# Patient Record
Sex: FEMALE | Race: WHITE
[De-identification: names, ages, dates, MRNs, and addresses within clinical notes are randomized per-mention and may not be internally consistent; named-entity substitution may affect disease eponyms.]

---

## 2019-04-12 ENCOUNTER — HOSPITAL ENCOUNTER (EMERGENCY)
Dept: HOSPITAL 89 - ER | Age: 49
Discharge: HOME | End: 2019-04-12
Payer: COMMERCIAL

## 2019-04-12 VITALS — SYSTOLIC BLOOD PRESSURE: 140 MMHG | DIASTOLIC BLOOD PRESSURE: 83 MMHG

## 2019-04-12 DIAGNOSIS — Z76.0: Primary | ICD-10-CM

## 2019-04-12 DIAGNOSIS — J45.909: ICD-10-CM

## 2019-04-12 PROCEDURE — 99282 EMERGENCY DEPT VISIT SF MDM: CPT

## 2019-04-12 NOTE — ER REPORT
History and Physical


Time Seen By MD:  12:45


Hx. of Stated Complaint:  


PATIENT STATES BRONCHITIS 1 MONTH AGO AND FINISHED ABD AND STEROIDS A FEW WEEKS 


AGO. JUST MOVED FROM [facility]. STATES SHE IS ALMOST OUT OF HER VENTOLIN. 


STATES HER BREATHING IS FINE BUT NOT THE BEST. APPEARS RELAXED AND NORMAL 


BREATHING


HPI/ROS


Moved to [place] from [place]. Is out of her Albuterol MDI, and has been needing it 


prn. No chest pain. No fever. Daily smoker. No PCM.


Remainder of the 14 system rev:  Yes


Allergies:  


Coded Allergies:  


     No Known Drug Allergies (Unverified , 4/12/19)


Home Meds


Active Scripts


Albuterol Sulfate (VENTOLIN HFA) 18 Gm Inh, 2 PUFF INH Q4-6H for 30 Days, #1 INH


   Prov:RADHA PHILLIPS MD         4/12/19


Reported Medications


Albuterol Sulfate (VENTOLIN HFA) 18 Gm Inh, 2 PUFF INH Q4-6H, INH


   4/12/19


Reviewed Nurses Notes:  Yes


Old Medical Records Reviewed:  Yes


Constitutional





Vital Sign - Last 24 Hours








 4/12/19





 12:29


 


Pulse 78


 


Resp 16


 


B/P (MAP) 140/83


 


Pulse Ox 97


 


O2 Delivery Room Air








Physical Exam


  General Appearance: The patient is alert, has no immediate need for airway 


protection and no current signs of toxicity.  


Eyes: Pupils equal and round no injection.


Respiratory: Chest is non tender, lungs are clear to auscultation.


Cardiac: regular rate and rhythm 


Skin: No rashes or lesions.





Medical Decision Making


ED Course/Re-evaluation


ED Course


No acute complaints. Presented to the ED for refill of Albuterol MDI. Given RX 


for Ventolin.


Decision to Disposition Date:  Apr 12, 2019


Decision to Disposition Time:  13:45





Depart


Departure


Latest Vital Signs





Vital Signs








  Date Time  Temp Pulse Resp B/P (MAP) Pulse Ox O2 Delivery O2 Flow Rate FiO2


 


4/12/19 12:29  78 16 140/83 97 Room Air  








Impression:  


   Primary Impression:  


   Medication refill


Condition:  Improved


Disposition:  HOME OR SELF-CARE


New Scripts


Albuterol Sulfate (VENTOLIN HFA) 18 Gm Inh


2 PUFF INH Q4-6H for 30 Days, #1 INH


   Prov: RADHA PHILLIPS MD         4/12/19


Departure Forms:  Medications Reconciliation,    Patient Portal Information, ER 


Transition Record





Additional Instructions:  


You have an appointment scheduled with a primary provider on Friday April 26 at 


0800 in the [facility]











RADHA PHILLIPS MD                 Apr 12, 2019 13:48